# Patient Record
Sex: FEMALE | Race: WHITE | URBAN - METROPOLITAN AREA
[De-identification: names, ages, dates, MRNs, and addresses within clinical notes are randomized per-mention and may not be internally consistent; named-entity substitution may affect disease eponyms.]

---

## 2022-10-10 ENCOUNTER — OFFICE VISIT (OUTPATIENT)
Dept: URBAN - METROPOLITAN AREA CLINIC 71 | Facility: CLINIC | Age: 67
End: 2022-10-10
Payer: MEDICARE

## 2022-10-10 DIAGNOSIS — H04.123 DRY EYE SYNDROME OF BILATERAL LACRIMAL GLANDS: Primary | ICD-10-CM

## 2022-10-10 DIAGNOSIS — H43.813 VITREOUS DEGENERATION, BILATERAL: ICD-10-CM

## 2022-10-10 DIAGNOSIS — H11.153 PINGUECULA, BILATERAL: ICD-10-CM

## 2022-10-10 PROCEDURE — 99204 OFFICE O/P NEW MOD 45 MIN: CPT | Performed by: OPTOMETRIST

## 2022-10-10 RX ORDER — PREDNISOLONE ACETATE 10 MG/ML
1 % SUSPENSION/ DROPS OPHTHALMIC
Qty: 5 | Refills: 0 | Status: ACTIVE
Start: 2022-10-10

## 2022-10-10 ASSESSMENT — INTRAOCULAR PRESSURE
OD: 12
OS: 11

## 2022-10-10 ASSESSMENT — KERATOMETRY
OS: 43.25
OD: 42.88

## 2022-10-10 NOTE — IMPRESSION/PLAN
Impression: Dry eye syndrome of bilateral lacrimal glands: H04.123. Episcleritis OS. Plan: Discussed dry eye disease. Use artificial tears up to QID. Recommend Systane Complete, Refresh Optive Advanced or other oil based lubricating drops. Discussed using the drops before the eyes feel dry to help prevent symptoms. Emphasized importance of good blinking habits, especially with extended near/computer work. START Prednisolone QID OS x1 week, then taper weekly. Glaucoma precautions given. Rx sent to pharmacy. Recheck in 2-3 weeks. Call if worsens or no improvement.

## 2022-10-10 NOTE — IMPRESSION/PLAN
Impression: Pinguecula, bilateral: H11.153. Plan: Educated pt on findings. Continue to monitor. Use lubricating drops for comfort.

## 2022-10-31 ENCOUNTER — OFFICE VISIT (OUTPATIENT)
Dept: URBAN - METROPOLITAN AREA CLINIC 71 | Facility: CLINIC | Age: 67
End: 2022-10-31
Payer: MEDICARE

## 2022-10-31 DIAGNOSIS — H11.153 PINGUECULA, BILATERAL: ICD-10-CM

## 2022-10-31 DIAGNOSIS — H16.213 EXPOSURE KERATOCONJUNCTIVITIS, BILATERAL: Primary | ICD-10-CM

## 2022-10-31 PROCEDURE — 99212 OFFICE O/P EST SF 10 MIN: CPT | Performed by: OPTOMETRIST

## 2022-10-31 ASSESSMENT — INTRAOCULAR PRESSURE
OD: 13
OS: 14

## 2022-10-31 NOTE — IMPRESSION/PLAN
Impression: Pinguecula, bilateral: H11.153. Plan: Continue to monitor. Use lubricating drops for comfort.

## 2022-10-31 NOTE — IMPRESSION/PLAN
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 Episcleritis OS resolved. Incomplete blink OU. Pt inquiring about dry eye maintenance, symptoms worse in the middle of the night and AM. Plan: Discussed dry eye disease again. Continue to use artificial tears up to QID. Recommend using Genteal Gel Severe before bed OU. Also recommend Vista Advanced Dry Eye Formula supplements. Request sent to [facility] pharmacy. Again emphasized importance of good blinking habits, especially with extended near/computer work. Call if worsens or no improvement.